# Patient Record
(demographics unavailable — no encounter records)

---

## 2025-04-15 NOTE — HISTORY OF PRESENT ILLNESS
[FreeTextEntry8] : Acute care visit PCP Dr Montanez Here with daughter  States she has was sick 2 weeks ago with family.  Symptoms improved, but throat pain has continued on and off.  Feels like something in her throat, pain with swallowing. No other symptoms

## 2025-04-15 NOTE — PHYSICAL EXAM
[Normal] : no jugular venous distention, supple, no lymphadenopathy and the thyroid was normal and there were no nodules present [de-identified] : tonsil stones

## 2025-07-08 NOTE — PHYSICAL EXAM
[de-identified] : General: NAD HEENT: NC/AT, EOMI, PERRLA, pharynx moist and pink, no exudate. Neck: supple, no JVD. CVS: S1, S2 normal, RRR, no m/g/r Resp: CTA b/l, no wheeze/rale/rhonchi Abdomen: soft, NT/ND, positive bowel sounds. no HSM. Extremities: no edema, peripheral pulses 2+ bilaterally.  Neuro: aaox3, 5/5 motor strength in all 4 extremities. normal sensation, normal gait.  Psych: normal affect, no SI/HI.

## 2025-07-08 NOTE — REVIEW OF SYSTEMS
[FreeTextEntry2] :  Denies headcahe, problem with vision, cp, sob, abdominal pain, problem with bowel and bladder  [FreeTextEntry8] : reports change in BM